# Patient Record
Sex: MALE | Race: WHITE | NOT HISPANIC OR LATINO | Employment: FULL TIME | URBAN - METROPOLITAN AREA
[De-identification: names, ages, dates, MRNs, and addresses within clinical notes are randomized per-mention and may not be internally consistent; named-entity substitution may affect disease eponyms.]

---

## 2023-04-23 ENCOUNTER — HOSPITAL ENCOUNTER (EMERGENCY)
Facility: CLINIC | Age: 53
Discharge: HOME | End: 2023-04-23
Attending: FAMILY MEDICINE | Admitting: FAMILY MEDICINE
Payer: COMMERCIAL

## 2023-04-23 VITALS
BODY MASS INDEX: 39.17 KG/M2 | WEIGHT: 315 LBS | TEMPERATURE: 98.4 F | HEIGHT: 75 IN | SYSTOLIC BLOOD PRESSURE: 145 MMHG | DIASTOLIC BLOOD PRESSURE: 85 MMHG | RESPIRATION RATE: 19 BRPM | HEART RATE: 95 BPM | OXYGEN SATURATION: 95 %

## 2023-04-23 DIAGNOSIS — S61.310A LACERATION OF RIGHT INDEX FINGER WITH DAMAGE TO NAIL, FOREIGN BODY PRESENCE UNSPECIFIED, INITIAL ENCOUNTER: ICD-10-CM

## 2023-04-23 PROCEDURE — 12001 RPR S/N/AX/GEN/TRNK 2.5CM/<: CPT

## 2023-04-23 PROCEDURE — 99282 EMERGENCY DEPT VISIT SF MDM: CPT

## 2023-04-23 ASSESSMENT — ACTIVITIES OF DAILY LIVING (ADL): ADLS_ACUITY_SCORE: 35

## 2023-04-23 ASSESSMENT — ENCOUNTER SYMPTOMS: WOUND: 1

## 2023-04-23 NOTE — ED PROVIDER NOTES
EMERGENCY DEPARTMENT ENCOUNTER      NAME: Sly Bruner  AGE: 52 year old male  YOB: 1970  MRN: [id number]  EVALUATION DATE & TIME: No admission date for patient encounter.    PCP: No primary care provider on file.    ED PROVIDER: Ronald Loera M.D.    Chief Complaint   Patient presents with     Laceration       FINAL IMPRESSION:  1. Laceration of right index finger with damage to nail, foreign body presence unspecified, initial encounter        ED COURSE & MEDICAL DECISION MAKING:    Pertinent Labs & Imaging studies independently interpreted by me. (See chart for details)  4:48 PM Patient seen and examined, external records reviewed.  Patient presents with laceration right index finger.  This is essentially a flap on the edge of the finger and just barely makes the base of the nail.  Did consider tissue adhesive for this but given ongoing bleeding, would benefit from sutures for tamponade.  5:03 PM I sutured the patient's laceration     At the conclusion of the encounter I discussed the results of all of the tests and the disposition. The questions were answered. The patient or family acknowledged understanding and was agreeable with the care plan.     Medical Decision Making    History:    Supplemental history from: Documented in chart, if applicable    External Record(s) reviewed: Documented in chart, if applicable.    Work Up:    Chart documentation includes differential considered and any EKGs or imaging independently interpreted by provider, where specified.    In additional to work up documented, I considered the following work up: Documented in chart, if applicable.    External consultation:    Discussion of management with another provider: Documented in chart, if applicable    Complicating factors:    Care impacted by chronic illness: Diabetes    Care affected by social determinants of health: N/A    Disposition considerations: Discharge. No recommendations on prescription  "strength medication(s). See documentation for any additional details.      PROCEDURES:   PROCEDURE: Laceration Repair   INDICATIONS: Laceration   PROCEDURE PROVIDER: Dr Ronald Loera   SITE: Distal radial aspect of right index finger   TYPE/SIZE: simple, clean and no foreign body visualized  2 cm (total length)   FUNCTIONAL ASSESSMENT: Distal sensation, circulation and motor intact   MEDICATION: 0.5 mLs of 1% Lidocaine with epinephrine   PREPARATION: irrigation with Normal saline   DEBRIDEMENT: no debridement, wound exposure no foreign body found   CLOSURE:  Superficial layer closed with 3 stitches of 6-0 Ethilon simple interrupted    Total number of sutures/staples placed: 3       MEDICATIONS GIVEN IN THE EMERGENCY:  Medications - No data to display    NEW PRESCRIPTIONS STARTED AT TODAY'S ER VISIT  New Prescriptions    No medications on file       =================================================================    HPI    Patient information was obtained from: Patient       Sly Bruner is a 52 year old male with a pertinent history of DM type II who presents to this ED via walk-in for evaluation of a laceration     The patient reports about 15 minutes prior to arrival they were cooking when they dropped a knife, tried to catch it, and \"caught it the wrong way\" causing a laceration to their right index finger. The patient notes they are right handed and their last tetanus shot was about two years ago.    The patient notes they are from [place] and are living up here in extended stay for work. They note they will be going back to [place] in about a week.     REVIEW OF SYSTEMS   Review of Systems   Skin: Positive for wound (laceration to right index finger).      All other systems reviewed and negative    PAST MEDICAL HISTORY:  No past medical history on file.    PAST SURGICAL HISTORY:  No past surgical history on file.    CURRENT MEDICATIONS:    No current facility-administered medications for this " "encounter.     No current outpatient medications on file.       ALLERGIES:  Allergies   Allergen Reactions     Penicillins Anaphylaxis       FAMILY HISTORY:  No family history on file.    SOCIAL HISTORY:        VITALS:  BP (!) 145/85   Pulse 95   Temp 98.4  F (36.9  C)   Resp 19   Ht 1.905 m (6' 3\")   Wt (!) 161.5 kg (356 lb)   SpO2 95%   BMI 44.50 kg/m      PHYSICAL EXAM:  Physical Exam  Vitals and nursing note reviewed.   Constitutional:       Appearance: Normal appearance.   HENT:      Head: Normocephalic and atraumatic.      Right Ear: External ear normal.      Left Ear: External ear normal.      Nose: Nose normal.   Eyes:      Extraocular Movements: Extraocular movements intact.      Conjunctiva/sclera: Conjunctivae normal.      Pupils: Pupils are equal, round, and reactive to light.   Pulmonary:      Effort: Pulmonary effort is normal.   Musculoskeletal:         General: No swelling or deformity. Normal range of motion.      Cervical back: Normal range of motion.   Skin:     Findings: Wound present.      Comments: 2 CM flap laceration to distal radial aspect of right index finger involving the edge of the base of the nail    Neurological:      General: No focal deficit present.      Mental Status: He is alert and oriented to person, place, and time. Mental status is at baseline.   Psychiatric:         Mood and Affect: Mood normal.         Behavior: Behavior normal.         Thought Content: Thought content normal.          LAB:  All pertinent labs reviewed and interpreted.       RADIOLOGY:  Reviewed all pertinent imaging. Please see official radiology report.  No orders to display       I, Stephanie Downs, am serving as a scribe to document services personally performed by Dr. Loera based on my observation and the provider's statements to me. I, Ronald Loera MD attest that Stephanie Downs is acting in a scribe capacity, has observed my performance of the services and has documented them in " accordance with my direction.    Ronald Loera M.D.  Emergency Medicine  [facility] EMERGENCY ROOM  [facility] [facility] [id number] [phone]  Dept: [phone]     Ronald Loera MD  04/23/23 1725       Ronald Loera MD  04/23/23 1728

## 2023-04-23 NOTE — ED TRIAGE NOTES
Laceration to right index finger from trying to catch a knife that he dropped.      Triage Assessment     Row Name 04/23/23 4858       Triage Assessment (Adult)    Airway WDL WDL       Respiratory WDL    Respiratory WDL WDL       Skin Circulation/Temperature WDL    Skin Circulation/Temperature WDL WDL       Cardiac WDL    Cardiac WDL WDL       Peripheral/Neurovascular WDL    Peripheral Neurovascular WDL WDL       Cognitive/Neuro/Behavioral WDL    Cognitive/Neuro/Behavioral WDL WDL